# Patient Record
Sex: FEMALE | URBAN - METROPOLITAN AREA
[De-identification: names, ages, dates, MRNs, and addresses within clinical notes are randomized per-mention and may not be internally consistent; named-entity substitution may affect disease eponyms.]

---

## 2021-08-24 ENCOUNTER — OFFICE VISIT (OUTPATIENT)
Dept: URBAN - METROPOLITAN AREA CLINIC 6 | Facility: CLINIC | Age: 66
End: 2021-08-24
Payer: COMMERCIAL

## 2021-08-24 DIAGNOSIS — H11.153 PINGUECULA, BILATERAL: ICD-10-CM

## 2021-08-24 DIAGNOSIS — Z96.1 PRESENCE OF INTRAOCULAR LENS: ICD-10-CM

## 2021-08-24 DIAGNOSIS — H43.811 VITREOUS DEGENERATION, RIGHT EYE: Primary | ICD-10-CM

## 2021-08-24 DIAGNOSIS — H04.123 TEAR FILM INSUFFICIENCY OF BILATERAL LACRIMAL GLANDS: ICD-10-CM

## 2021-08-24 DIAGNOSIS — H35.371 PUCKERING OF MACULA, RIGHT EYE: ICD-10-CM

## 2021-08-24 PROCEDURE — 92014 COMPRE OPH EXAM EST PT 1/>: CPT | Performed by: OPTOMETRIST

## 2021-08-24 ASSESSMENT — INTRAOCULAR PRESSURE
OS: 13
OD: 12

## 2021-08-24 NOTE — IMPRESSION/PLAN
Impression: Puckering of macula, right eye: H35.371. Plan: Epiretinal membrane both eyes - Mild with no CME - Plan to observe for now without intervention.

## 2021-08-24 NOTE — IMPRESSION/PLAN
Impression: Emergency visit for Vitreous degeneration, right eye: H43.811. Plan: Posterior vitreous detachment (floater)  - reviewed the signs and symptoms of possible retinal detachment (flashes, floaters, change in peripheral vision, etc). Advised to contact us immediately for any of these or other new symptoms.

## 2021-08-24 NOTE — IMPRESSION/PLAN
Impression: Presence of intraocular lens: Z96.1. Plan: s/p cataract surgery both eyes - well healed and patient happy with results of the surgeries at this time. Advised patient concerning the signs and symptoms of retina detachment, including flashes, floaters, and dark veil. Return to the clinic or call immediately if any issues arise.

## 2022-09-27 ENCOUNTER — OFFICE VISIT (OUTPATIENT)
Dept: URBAN - METROPOLITAN AREA CLINIC 6 | Facility: CLINIC | Age: 67
End: 2022-09-27
Payer: COMMERCIAL

## 2022-09-27 DIAGNOSIS — Z96.1 PRESENCE OF INTRAOCULAR LENS: ICD-10-CM

## 2022-09-27 DIAGNOSIS — H33.011 RETINAL DETACHMENT WITH SINGLE BREAK, RIGHT EYE: Primary | ICD-10-CM

## 2022-09-27 DIAGNOSIS — H35.371 PUCKERING OF MACULA, RIGHT EYE: ICD-10-CM

## 2022-09-27 DIAGNOSIS — H11.153 PINGUECULA, BILATERAL: ICD-10-CM

## 2022-09-27 DIAGNOSIS — H04.123 TEAR FILM INSUFFICIENCY OF BILATERAL LACRIMAL GLANDS: ICD-10-CM

## 2022-09-27 PROCEDURE — 92134 CPTRZ OPH DX IMG PST SGM RTA: CPT | Performed by: OPTOMETRIST

## 2022-09-27 PROCEDURE — 92014 COMPRE OPH EXAM EST PT 1/>: CPT | Performed by: OPTOMETRIST

## 2022-09-27 ASSESSMENT — INTRAOCULAR PRESSURE
OS: 12
OD: 10

## 2022-09-27 NOTE — IMPRESSION/PLAN
Impression: Shallow retinal detachment, right eye: H33.011. Plan: referred to retina specialist.  MOCT flat OU. RD warnings given.

## 2022-09-27 NOTE — IMPRESSION/PLAN
Impression: Puckering of macula, right eye: H35.371. Plan: Epiretinal membrane  - Mild with no CME - Plan to observe for now without intervention.

## 2023-03-28 ENCOUNTER — OFFICE VISIT (OUTPATIENT)
Dept: URBAN - METROPOLITAN AREA CLINIC 6 | Facility: CLINIC | Age: 68
End: 2023-03-28
Payer: COMMERCIAL

## 2023-03-28 DIAGNOSIS — H35.371 PUCKERING OF MACULA, RIGHT EYE: ICD-10-CM

## 2023-03-28 DIAGNOSIS — H04.123 TEAR FILM INSUFFICIENCY OF BILATERAL LACRIMAL GLANDS: ICD-10-CM

## 2023-03-28 DIAGNOSIS — H33.011 RETINAL DETACHMENT WITH SINGLE BREAK, RIGHT EYE: Primary | ICD-10-CM

## 2023-03-28 DIAGNOSIS — Z96.1 PRESENCE OF INTRAOCULAR LENS: ICD-10-CM

## 2023-03-28 PROCEDURE — 92014 COMPRE OPH EXAM EST PT 1/>: CPT | Performed by: OPTOMETRIST

## 2023-03-28 ASSESSMENT — INTRAOCULAR PRESSURE
OD: 10
OS: 13

## 2023-03-28 NOTE — IMPRESSION/PLAN
Impression: Retinal detachment, right eye: H33.011. Plan: under the care of Dr. Douglas. S/P repair. RD warnings given.

## 2025-02-18 ENCOUNTER — OFFICE VISIT (OUTPATIENT)
Dept: URBAN - METROPOLITAN AREA CLINIC 6 | Facility: CLINIC | Age: 70
End: 2025-02-18
Payer: COMMERCIAL

## 2025-02-18 DIAGNOSIS — H33.011 RETINAL DETACHMENT WITH SINGLE BREAK, RIGHT EYE: Primary | ICD-10-CM

## 2025-02-18 DIAGNOSIS — H04.123 TEAR FILM INSUFFICIENCY OF BILATERAL LACRIMAL GLANDS: ICD-10-CM

## 2025-02-18 DIAGNOSIS — H35.371 PUCKERING OF MACULA, RIGHT EYE: ICD-10-CM

## 2025-02-18 PROCEDURE — 92014 COMPRE OPH EXAM EST PT 1/>: CPT | Performed by: OPTOMETRIST

## 2025-02-18 PROCEDURE — 92134 CPTRZ OPH DX IMG PST SGM RTA: CPT | Performed by: OPTOMETRIST

## 2025-02-18 ASSESSMENT — INTRAOCULAR PRESSURE
OS: 10
OD: 13